# Patient Record
Sex: FEMALE | NOT HISPANIC OR LATINO
[De-identification: names, ages, dates, MRNs, and addresses within clinical notes are randomized per-mention and may not be internally consistent; named-entity substitution may affect disease eponyms.]

---

## 2023-08-29 ENCOUNTER — APPOINTMENT (OUTPATIENT)
Dept: OTOLARYNGOLOGY | Facility: CLINIC | Age: 68
End: 2023-08-29
Payer: MEDICARE

## 2023-08-29 DIAGNOSIS — Z78.9 OTHER SPECIFIED HEALTH STATUS: ICD-10-CM

## 2023-08-29 DIAGNOSIS — Z86.19 PERSONAL HISTORY OF OTHER INFECTIOUS AND PARASITIC DISEASES: ICD-10-CM

## 2023-08-29 DIAGNOSIS — Z80.9 FAMILY HISTORY OF MALIGNANT NEOPLASM, UNSPECIFIED: ICD-10-CM

## 2023-08-29 DIAGNOSIS — H60.541 ACUTE ECZEMATOID OTITIS EXTERNA, RIGHT EAR: ICD-10-CM

## 2023-08-29 DIAGNOSIS — Z82.0 FAMILY HISTORY OF EPILEPSY AND OTHER DISEASES OF THE NERVOUS SYSTEM: ICD-10-CM

## 2023-08-29 PROBLEM — Z00.00 ENCOUNTER FOR PREVENTIVE HEALTH EXAMINATION: Status: ACTIVE | Noted: 2023-08-29

## 2023-08-29 PROCEDURE — 99203 OFFICE O/P NEW LOW 30 MIN: CPT | Mod: 25

## 2023-08-29 PROCEDURE — 69210 REMOVE IMPACTED EAR WAX UNI: CPT

## 2023-08-29 RX ORDER — ALPRAZOLAM 2 MG/1
TABLET ORAL
Refills: 0 | Status: ACTIVE | COMMUNITY

## 2023-09-07 LAB — EAR NOSE AND THROAT CULTURE: ABNORMAL

## 2023-09-07 NOTE — PHYSICAL EXAM
[FreeTextEntry1] :  The patient was alert and oriented and in no distress. Voice was clear.  Face: The patient had no facial asymmetry or mass. The skin was unremarkable.  Eyes: The pupils were equal round and reactive to light and accommodation. There was no significant nystagmus or disconjugate gaze noted.  Nose:  The external nose had no significant deformity.  There was no facial tenderness.  On anterior rhinoscopy, the nasal mucosa was clear.  The anterior septum was midline.  There were no visualized polyps purulence  or masses.  Oral cavity: The oral mucosa was normal. The oral and base of tongue were clear and without mass. The gingival and buccal mucosa were moist and without lesions. The palate moved well. There was no cleft to the palate. There appeared to be good salivary flow.   There was no pus, erythema or mass in the oral cavity.   Ears: Ears: Procedure note: Debridement of cerumen impaction left ear   73000  Dx:  symptomatic cerumen impaction left ear  Both external ears were normal. There was a dense cerumen impaction in the left ear.  This was cleared microscopically without trauma, using suction and curettes. On the right the canal was beefy edematous and swollen with pus and cerumen.  This was suctioned to clear, cultured and treated with CSF powder  Neck:  The neck was symmetrical. The parotid and submandibular glands were normal without masses. The trachea was midline and there was no unusual crepitus. The thyroid was smooth and nontender and no masses were palpated. There was no significant cervical adenopathy.   Neuro: Neurologically, the patient was awake, alert, and oriented to person, place and time. There were no obvious focal neurologic abnormalities.  Cranial nerves II through XII were grossly intact.   TMJ: The temporomandibular joints were nontender. There was no abnormal crepitus and no significant malocclusion

## 2023-09-07 NOTE — CONSULT LETTER
[FreeTextEntry2] : JESENIA KUHN [FreeTextEntry1] :   Dear  Dr. JESENIA KUHN,  I had the pleasure of seeing your patient today.   Please see my note below.   Thank you very much for allowing me to participate in the care of your patient.  Sincerely,  John Hutton MD [facility]  [facility]

## 2023-09-07 NOTE — ASSESSMENT
[FreeTextEntry1] : It was my impression that she had a cerumen impaction and dry skin in the left ear canal.  This was debrided and I recommended topical moisturizing none [place] the with oil.  If this is not good enough I would switch to DermOtic probably rather than Cortisporin to avoid the additional antibiotic.  On the right side she has a significant otitis externa.  This was debrided with the cerumen and may well be fungal.  Although there were no obvious spores I cultured this and treated with CSF powder and recommended keeping the ear dry.  I will treat additionally if indicated by the culture and would like to see her back in follow-up next week

## 2023-09-07 NOTE — HISTORY OF PRESENT ILLNESS
[de-identified] : HAY SUAREZ is a 68 year old female who comes in complaining of many years of problems with cerumen impactions.  She usually needs to get them cleaned 2 or 3 times a year.  She was told she had an eczematoid otitis externa and was placed on weekly Cortisporin.  She had been doing well but her ears were feeling blocked and then she used the drops about a week ago and she had significant otalgia with diminished hearing.  The symptoms are worse on the right than on the left.  The patient had no other ear nose or throat complaints at this visit.

## 2023-09-12 ENCOUNTER — APPOINTMENT (OUTPATIENT)
Dept: OTOLARYNGOLOGY | Facility: CLINIC | Age: 68
End: 2023-09-12
Payer: MEDICARE

## 2023-09-12 DIAGNOSIS — H62.40 SUPERFICIAL MYCOSIS, UNSPECIFIED: ICD-10-CM

## 2023-09-12 DIAGNOSIS — B36.9 SUPERFICIAL MYCOSIS, UNSPECIFIED: ICD-10-CM

## 2023-09-12 PROCEDURE — 99213 OFFICE O/P EST LOW 20 MIN: CPT

## 2024-03-19 ENCOUNTER — APPOINTMENT (OUTPATIENT)
Dept: OTOLARYNGOLOGY | Facility: CLINIC | Age: 69
End: 2024-03-19
Payer: MEDICARE

## 2024-03-19 DIAGNOSIS — H60.8X3 OTHER OTITIS EXTERNA, BILATERAL: ICD-10-CM

## 2024-03-19 DIAGNOSIS — H60.311 DIFFUSE OTITIS EXTERNA, RIGHT EAR: ICD-10-CM

## 2024-03-19 DIAGNOSIS — H61.20 IMPACTED CERUMEN, UNSPECIFIED EAR: ICD-10-CM

## 2024-03-19 PROCEDURE — 99213 OFFICE O/P EST LOW 20 MIN: CPT | Mod: 25

## 2024-03-19 PROCEDURE — 69210 REMOVE IMPACTED EAR WAX UNI: CPT

## 2024-03-21 NOTE — PHYSICAL EXAM
[FreeTextEntry1] :  The patient was alert and oriented and in no distress. Voice was clear.  Face: The patient had no facial asymmetry or mass. The skin was unremarkable.  Eyes: The pupils were equal round and reactive to light and accommodation. There was no significant nystagmus or disconjugate gaze noted.  Nose:  The external nose had no significant deformity.  There was no facial tenderness.  On anterior rhinoscopy, the nasal mucosa was clear.  The anterior septum was midline.  There were no visualized polyps purulence  or masses.  Oral cavity: The oral mucosa was normal. The oral and base of tongue were clear and without mass. The gingival and buccal mucosa were moist and without lesions. The palate moved well. There was no cleft to the palate. There appeared to be good salivary flow.   There was no pus, erythema or mass in the oral cavity.   Ears: Ears: Procedure note: Debridement of cerumen impaction left ear   76180  Dx:  symptomatic cerumen impaction left ear  Both external ears were normal. There was a dense cerumen impaction in the left ear.  This was cleared microscopically without trauma, using suction and curettes.  Beyond that, both ear canals were clear and both eardrums were intact and mobile. She had a right-sided acute otitis externa with edematous and weepy canal skin.  It looks like it is probably bacterial but based on her previous history of otomycosis I treated this with CSF powder and recommended keeping the ear dry and took a culture and would treat as indicated and otherwise would like to see her back in follow-up in a week I recommended topical moisturizing now for the left ear again and once the infection is better in the right as well  Neck:  The neck was symmetrical. The parotid and submandibular glands were normal without masses. The trachea was midline and there was no unusual crepitus. The thyroid was smooth and nontender and no masses were palpated. There was no significant cervical adenopathy.   Neuro: Neurologically, the patient was awake, alert, and oriented to person, place and time. There were no obvious focal neurologic abnormalities.  Cranial nerves II through XII were grossly intact.   TMJ: The temporomandibular joints were nontender. There was no abnormal crepitus and no significant malocclusion

## 2024-03-21 NOTE — CONSULT LETTER
[FreeTextEntry2] : JESENIA KUHN [FreeTextEntry1] :   Dear  Dr. JESENIA KUHN,  I had the pleasure of seeing your patient today.   Please see my note below.   Thank you very much for allowing me to participate in the care of your patient.  Sincerely,  John Hutton MD [facility]  [facility]

## 2024-03-21 NOTE — HISTORY OF PRESENT ILLNESS
[de-identified] : HAY SUAREZ Was seen on March 19.  She comes in complaining of a week of diminished hearing and otalgia on the right side.  I had seen her previously with an eczematoid otitis externa and otomycosis.  She denies any significant left-sided symptoms.  The patient had no other ear nose or throat complaints at this visit.

## 2024-03-28 ENCOUNTER — APPOINTMENT (OUTPATIENT)
Dept: OTOLARYNGOLOGY | Facility: CLINIC | Age: 69
End: 2024-03-28
Payer: MEDICARE

## 2024-03-28 PROCEDURE — 99212 OFFICE O/P EST SF 10 MIN: CPT | Mod: 25

## 2024-03-28 PROCEDURE — 92504 EAR MICROSCOPY EXAMINATION: CPT

## 2024-03-28 NOTE — HISTORY OF PRESENT ILLNESS
[de-identified] : HAY SUAREZ Was seen in follow-up on March 28.  She was feeling much better.  Her culture was Aspergillus Niger.  She was treated with CSF powder and comes in for repeat evaluation

## 2024-03-28 NOTE — CONSULT LETTER
[FreeTextEntry2] : JESENIA KUHN [FreeTextEntry1] :   Dear  Dr. JESENIA KUHN,  I had the pleasure of seeing your patient today.   Please see my note below.   Thank you very much for allowing me to participate in the care of your patient.  Sincerely,  Janes Hutton MD [facility]  [facility]

## 2024-03-28 NOTE — PHYSICAL EXAM
[FreeTextEntry1] : General: The patient was alert and oriented and in no distress. Voice was clear.  Face: The patient had no facial asymmetry or mass. The skin was unremarkable.   Ears: An operating microscope was used for evaluation.  There was dry flaking skin of the left external canal and this was cleared microscopically without trauma. The right canal had again already some flaking debris and this was suctioned to clear and treated 1 more time with CSF powder

## 2024-03-28 NOTE — ASSESSMENT
[FreeTextEntry1] : It was my impression that she had another episode of otomycosis treated successfully with CSF powder.  The underlying etiology is an eczematoid dermatitis of both ear canals.  I recommended topical moisturizing on fingertip using the same cream she uses elsewhere hopefully that will help and would like to reevaluate in 6 months or earlier if needed. The option of using a cortisone oil was discussed but not recommended especially in view of the fungal infections.  If the cream is not working I would suggest a nonmedicated oil drop

## 2024-03-29 LAB — EAR NOSE AND THROAT CULTURE: ABNORMAL

## 2024-09-17 ENCOUNTER — APPOINTMENT (OUTPATIENT)
Dept: OTOLARYNGOLOGY | Facility: CLINIC | Age: 69
End: 2024-09-17
Payer: MEDICARE

## 2024-09-17 DIAGNOSIS — J31.0 CHRONIC RHINITIS: ICD-10-CM

## 2024-09-17 DIAGNOSIS — H60.8X3 OTHER OTITIS EXTERNA, BILATERAL: ICD-10-CM

## 2024-09-17 DIAGNOSIS — H61.20 IMPACTED CERUMEN, UNSPECIFIED EAR: ICD-10-CM

## 2024-09-17 PROCEDURE — 69210 REMOVE IMPACTED EAR WAX UNI: CPT

## 2024-09-17 PROCEDURE — 31231 NASAL ENDOSCOPY DX: CPT

## 2024-09-17 PROCEDURE — 99213 OFFICE O/P EST LOW 20 MIN: CPT | Mod: 25

## 2024-09-17 RX ORDER — CLOTRIMAZOLE AND BETAMETHASONE DIPROPIONATE 10; .5 MG/G; MG/G
1-0.05 CREAM TOPICAL 3 TIMES DAILY
Qty: 1 | Refills: 5 | Status: ACTIVE | COMMUNITY
Start: 2024-09-17 | End: 1900-01-01

## 2024-09-17 NOTE — ASSESSMENT
[FreeTextEntry1] : It is my impression that the patient has an an exczematoid otitis externa.  The eczematoid changes were much better it was my impression that the patient had a cerumen impaction that was cleared.  I recommended topical moisturizing.  I recommended avoiding Q-tips.  I reviewed aural hygiene and the role of cerumen with the patient.  I suggested a repeat visit in 6 months or earlier should the need arise.  It was my impression she had an area of mucositis of the left septal can cavity from secretions stagnating. I recommended Lotrisone topically and once this is better just to use a topical moisturizer as needed

## 2024-09-17 NOTE — HISTORY OF PRESENT ILLNESS
[de-identified] : HAY SUAREZ Was seen in follow-up on September 17.  She notes that her ear is much better with the topical moisturizing but she feels she has a cerumen impaction.  She is also complaining of a sore inside the nose the left.  Without purulence or fever or significant pain.

## 2024-09-17 NOTE — CONSULT LETTER
[FreeTextEntry1] :   Dear  Dr. JESENIA KUHN,  I had the pleasure of seeing your patient today.   Please see my note below.   Thank you very much for allowing me to participate in the care of your patient.  Sincerely,  John Hutton MD [facility]  [facility]  [FreeTextEntry2] : JESENIA KUHN